# Patient Record
(demographics unavailable — no encounter records)

---

## 2021-01-22 NOTE — RAD
EXAM DESCRIPTION:  Abdomen Series



CLINICAL HISTORY: nv



COMPARISON: 8/12/2018



FINDINGS: Single frontal view of the chest. Upright and supine

views of the abdomen. 



Cardiomediastinal silhouette: Normal size and contour. 

Lungs: No consolidation, pneumothorax, or pleural effusion. 



Bones: Degenerative change of the spine and hips. 





Bowel: No dilated loops of large or small bowel. Moderate amount

stool.

Peritoneum: No free intraperitoneal air identified.

Solid organs: No definite organomegaly.

Other: IVC filter. Right iliac venous stent.



IMPRESSION:





1. No acute pulmonary process identified.



2. Nonobstructive bowel gas pattern. Moderate amount of stool.



Electronically signed by:  Rolando Deal  1/22/2021 6:45 AM

CST Workstation: [id number]

## 2021-01-22 NOTE — ED.PDOC
History of Present Illness





- General


Source: patient


Exam Limitations: no limitations





- History of Present Illness


Initial Comments: 





The patient is a 55-year-old  male presented emergency room secondary 

to 2 days of nausea vomiting abdominal cramping.  No chest pain or shortness of 

breath.  No syncope.  No sore throat or runny nose.  No rash.  Questionable 

exposure to coronavirus.  The patient is an insulin-dependent diabetic.  He 

reports his last blood sugar check was greater than 700.  He denies any history 

of pancreatitis.  He reports he still has his gallbladder and his appendix.  

Abdominal pain is diffuse on exam however may be a little worse in the right 

lower quadrant.


Timing/Duration: other - 2 days


Severity: moderate


Improving Factors: nothing


Worsening Factors: eating


Associated Symptoms: loss of appetite, malaise, nausea/vomiting, weakness





<Tash Cole - Last Filed: 21 07:03>





<Jaun Stone - Last Filed: 21 07:59>





- General


Chief Complaint: GI Problem


Stated Complaint: N/V/D, Headache,


Time Seen by Provider: 21 05:41





- History of Present Illness


Allergies/Adverse Reactions: 


Allergies





Codeine Allergy (Severe, Verified 20 10:51)


   Anaphylaxis





Home Medications: 


Ambulatory Orders





Insulin Degludec [Tresiba Flextouch] 40 unit SC DAILY 19 


Sertraline HCl [Zoloft] 100 mg PO DAILY 19 


Apixaban [Eliquis] 5 mg PO BID 19 


Cyclobenzaprine HCl [Cyclobenzaprine Hydrochlo] 10 mg PO DAILY 19 


Insulin Lispro [Humalog Kwikpen] See Protocol INJ PRN 19 


Meloxicam 15 mg PO DAILY 19 


Lisinopril 5 mg PO DAILY 19 


Apixaban [Eliquis] 5 mg PO BID #39 tab 19 


Bifidobacterium Infantis [Align] 4 mg PO DAILY  cap 19 


Cefdinir 300 mg PO BID #24 capsule 19 


Doxycycline Hyclate 100 mg PO BID #24 cap 19 


Cole/Poly/Hc Otic Susp [Cortisporin Otic Susp] 4 drop LEFT_EAR Q6H #10 days 

20 


Cephalexin Monohydrate [Keflex] 500 mg PO QID #40 cap 20 


Sulfamethoxazole-Trimethoprim [Bactrim Ds 800-160 mg] 1 tab PO BID #20 tab 

20 


Cephalexin Monohydrate [Keflex] 500 mg PO QID 7 Days #28 cap 21 


Ondansetron Tab [Zofran Tab] 4 mg PO TID PRN #12 tab 21 











Review of Systems





- Review of Systems


Constitutional: States: malaise


EENTM: States: no symptoms reported


Respiratory: States: no symptoms reported


Cardiology: States: no symptoms reported


Gastrointestinal/Abdominal: States: abdominal pain, diarrhea - Mild, nausea, 

vomiting


Genitourinary: States: no symptoms reported


Musculoskeletal: States: no symptoms reported - Chronic problems only


Skin: States: no symptoms reported


Neurological: States: no symptoms reported


Endocrine: States: no symptoms reported


All other Systems: No Change from Baseline





<Tash Cole - Last Filed: 21 07:03>





Past Medical History (General)





- Patient Medical History


Hx Seizures: Yes


Hx Stroke: Yes


Hx Dementia: No


Hx Asthma: No


Hx of COPD: No


Hx Cardiac Disorders: Yes - MI


Hx Congestive Heart Failure: No


Hx Pacemaker: No


Hx Hypertension: No


Hx Thyroid Disease: No


Hx Diabetes: Yes


Hx Gastroesophageal Reflux: No


Hx Renal Disease: No


Hx Cancer: No


Hx of HIV: No


Hx Hepatitis C: No


Hx MRSA: No





- Vaccination History


Hx Tetanus, Diphtheria Vaccination: Yes


Hx Influenza Vaccination: No


Hx Pneumococcal Vaccination: No





- Social History


Hx Tobacco Use: No


Hx Chewing Tobacco Use: No


Hx Alcohol Use: Yes


Hx Substance Use: No


Hx Substance Use Treatment: No


Hx Depression: No


Hx Physical Abuse: No


Hx Emotional Abuse: No


Hx Suspected Abuse: No





<Tash Cole - Last Filed: 21 07:03>





Family Medical History





- Family History


  ** Mother


Family History: Unknown


Living Status: Still Living


Hx Family Asthma: No


Hx Family Congestive Heart Failure: No


Hx Family Hypertension: No


Hx Family Stroke: No


Hx Cardiac Disease: No


Hx Family Diabetes: No


Hx Family Cancer: Yes





  ** Father


Living Status: 


Hx Family Asthma: No


Hx Family Congestive Heart Failure: No


Hx Family Hypertension: Yes


Hx Family Stroke: Yes


Hx Cardiac Disease: Yes


Hx Family Diabetes: Yes


Hx Family Cancer: Yes





<Tash Cole - Last Filed: 21 07:03>





Physical Exam





- Physical Exam


General Appearance: Alert, Anxious


Eye Exam: bilateral normal


Ears, Nose, Throat: hearing grossly normal, normal pharynx


Neck: non-tender - Chronic changes from previous traumas, supple


Respiratory: lungs clear, normal breath sounds, no respiratory distress, no 

accessory muscle use


Cardiovascular/Chest: normal peripheral pulses, regular rate, rhythm, no edema


Peripheral Pulses: radial,right: 2+, radial,left: 2+


Gastrointestinal/Abdominal: soft, other - Obese with mild increased discomfort t

o palpation in the right lower quadrant at this point


Rectal Exam: deferred


Extremity: no pedal edema, no calf tenderness, normal capillary refill, other - 

Chronic changes related to previous traumas


Neurologic: CNs II-XII nml as tested, alert, normal mood/affect, oriented x 3


Skin Exam: normal color


Comments: 





                                Laboratory Tests











  21





  06:07 06:07 06:07


 


WBC   8.8 


 


RBC   5.66 


 


Hgb   16.5 


 


Hct   48.5 


 


MCV   85.8 


 


MCH   29.2 


 


MCHC   34.0 


 


RDW   13.8 


 


Plt Count   208 


 


MPV   7.9 


 


Absolute Neuts (auto)   4.60 


 


Absolute Lymphs (auto)   3.10 


 


Absolute Monos (auto)   0.60 


 


Absolute Eos (auto)   0.30 


 


Absolute Basos (auto)   0.10 


 


Neutrophils %   52.5 


 


Lymphocytes %   35.4 


 


Monocytes %   7.1 


 


Eosinophils %   3.7 


 


Basophils %   1.3 


 


PT    10.2


 


INR    1.03


 


PTT (SP)    25.2


 


Sodium  136  


 


Potassium  3.6  


 


Chloride  101  


 


Carbon Dioxide  24  


 


Anion Gap  14.6  


 


BUN  25 H  


 


Creatinine  0.92  


 


BUN/Creatinine Ratio  27.2 H  


 


Random Glucose  261 H  


 


Serum Osmolality  285.4  


 


Lactic Acid   


 


Calcium  9.2  


 


Magnesium  1.7 L  


 


Total Bilirubin  1.0  


 


AST  20  


 


ALT  29  


 


Alkaline Phosphatase  110  


 


Creatine Kinase  129  


 


CK-MB (CK-2)  2.2  


 


CK-MB (CK-2) %  Not Reportable  


 


Troponin I  < 0.02  


 


B-Natriuretic Peptide  < 15.0  


 


Serum Total Protein  7.9  


 


Albumin  3.8  


 


Globulin  4.1 H  


 


Albumin/Globulin Ratio  0.9 L  


 


Amylase  64  


 


Lipase  63 H  


 


TSH  4.30  


 


Urine Color   


 


Urine Appearance   


 


Urine pH   


 


Ur Specific Gravity   


 


Urine Protein   


 


Urine Glucose (UA)   


 


Urine Ketones   


 


Urine Blood   


 


Urine Nitrite   


 


Urine Bilirubin   


 


Urine Urobilinogen   


 


Ur Leukocyte Esterase   


 


Urine RBC   


 


Urine WBC   


 


Ur Epithelial Cells   


 


Urine Bacteria   


 


Urine Mucus   














  21





  06:07 06:35


 


WBC  


 


RBC  


 


Hgb  


 


Hct  


 


MCV  


 


MCH  


 


MCHC  


 


RDW  


 


Plt Count  


 


MPV  


 


Absolute Neuts (auto)  


 


Absolute Lymphs (auto)  


 


Absolute Monos (auto)  


 


Absolute Eos (auto)  


 


Absolute Basos (auto)  


 


Neutrophils %  


 


Lymphocytes %  


 


Monocytes %  


 


Eosinophils %  


 


Basophils %  


 


PT  


 


INR  


 


PTT (SP)  


 


Sodium  


 


Potassium  


 


Chloride  


 


Carbon Dioxide  


 


Anion Gap  


 


BUN  


 


Creatinine  


 


BUN/Creatinine Ratio  


 


Random Glucose  


 


Serum Osmolality  


 


Lactic Acid  1.5 


 


Calcium  


 


Magnesium  


 


Total Bilirubin  


 


AST  


 


ALT  


 


Alkaline Phosphatase  


 


Creatine Kinase  


 


CK-MB (CK-2)  


 


CK-MB (CK-2) %  


 


Troponin I  


 


B-Natriuretic Peptide  


 


Serum Total Protein  


 


Albumin  


 


Globulin  


 


Albumin/Globulin Ratio  


 


Amylase  


 


Lipase  


 


TSH  


 


Urine Color   Yellow


 


Urine Appearance   Sl cloudy


 


Urine pH   5.5


 


Ur Specific Gravity   >= 1.030


 


Urine Protein   Negative


 


Urine Glucose (UA)   250 H


 


Urine Ketones   Negative


 


Urine Blood   Negative


 


Urine Nitrite   Negative


 


Urine Bilirubin   Negative


 


Urine Urobilinogen   0.2


 


Ur Leukocyte Esterase   Trace H


 


Urine RBC   0-1


 


Urine WBC   40-50 H


 


Ur Epithelial Cells   5-10


 


Urine Bacteria   Rare


 


Urine Mucus   Small














<Tash Cole - Last Filed: 21 07:03>





Progress





- Progress


Progress: 





21 07:04


The patient presents secondary to nausea and vomiting with uncontrolled 

diabetes.  Blood sugars are actually much better than what he had indicated.  

There may be a very small ion gap.  He is receiving a liter of IV fluids.  He is

receiving nausea medications.  He will need to be followed to make sure he can 

tolerate oral intake.  Several labs and imaging are still pending at this time. 

Patient to be followed by the oncoming ER physician.





tash cole [id number]





- Results/Orders


Results/Orders: 





covid negative





<Tash Cole - Last Filed: 21 07:03>





- Progress


Progress: 





0700: I, Dr. Jaun Stone, DO #[id number], have taken over care of pt. 





07:48 Recheck pt with spouse at bedside. NAD, VSS, and is feeling improved with 

mild continued headache and controlled nausea. PO tolerant. Pt does state that 

he has had some dysuria recently and in the setting of his UA with symptoms, I 

will prescribe antibiotics as well as antiemetic for home. He does state that 

both him and his wife became sick with epigastric discomfort and n/v s/p eating 

fajitas last night with sour cream. They both feel this is the source of his 

current symptoms. I have discussed radiology results, my clinical impression, 

and diagnosis. I have also discussed plan for discharge home with f/u, 

education, and prescribed medications. ED return precautions provided. Pt and 

family member voice understanding, agree with plan, and all questions answered.








- Results/Orders


Results/Orders: 





                                        





21 06:07


Telemetry .CONTINUOUS 


Vital Signs-Tilt PRN 





21 06:08


ABG [Arterial Blood Gas] Stat 





21 06:35


URINE CULTURE W/COLONY COUNT Stat 





21 07:11


Miscellaneous Nursing Order .ONCE 








                         Laboratory Results - last 24 hr











  21





  06:07 06:07 06:07


 


WBC   8.8 


 


RBC   5.66 


 


Hgb   16.5 


 


Hct   48.5 


 


MCV   85.8 


 


MCH   29.2 


 


MCHC   34.0 


 


RDW   13.8 


 


Plt Count   208 


 


MPV   7.9 


 


Absolute Neuts (auto)   4.60 


 


Absolute Lymphs (auto)   3.10 


 


Absolute Monos (auto)   0.60 


 


Absolute Eos (auto)   0.30 


 


Absolute Basos (auto)   0.10 


 


Neutrophils %   52.5 


 


Lymphocytes %   35.4 


 


Monocytes %   7.1 


 


Eosinophils %   3.7 


 


Basophils %   1.3 


 


PT    10.2


 


INR    1.03


 


PTT (SP)    25.2


 


Sodium  136  


 


Potassium  3.6  


 


Chloride  101  


 


Carbon Dioxide  24  


 


Anion Gap  14.6  


 


BUN  25 H  


 


Creatinine  0.92  


 


BUN/Creatinine Ratio  27.2 H  


 


Random Glucose  261 H  


 


Serum Osmolality  285.4  


 


Lactic Acid   


 


Calcium  9.2  


 


Magnesium  1.7 L  


 


Total Bilirubin  1.0  


 


AST  20  


 


ALT  29  


 


Alkaline Phosphatase  110  


 


Creatine Kinase  129  


 


CK-MB (CK-2)  2.2  


 


CK-MB (CK-2) %  Not Reportable  


 


Troponin I  < 0.02  


 


B-Natriuretic Peptide  < 15.0  


 


Serum Total Protein  7.9  


 


Albumin  3.8  


 


Globulin  4.1 H  


 


Albumin/Globulin Ratio  0.9 L  


 


Amylase  64  


 


Lipase  63 H  


 


TSH  4.30  


 


Urine Color   


 


Urine Appearance   


 


Urine pH   


 


Ur Specific Gravity   


 


Urine Protein   


 


Urine Glucose (UA)   


 


Urine Ketones   


 


Urine Blood   


 


Urine Nitrite   


 


Urine Bilirubin   


 


Urine Urobilinogen   


 


Ur Leukocyte Esterase   


 


Urine RBC   


 


Urine WBC   


 


Ur Epithelial Cells   


 


Urine Bacteria   


 


Urine Mucus   














  21





  06:07 06:35


 


WBC  


 


RBC  


 


Hgb  


 


Hct  


 


MCV  


 


MCH  


 


MCHC  


 


RDW  


 


Plt Count  


 


MPV  


 


Absolute Neuts (auto)  


 


Absolute Lymphs (auto)  


 


Absolute Monos (auto)  


 


Absolute Eos (auto)  


 


Absolute Basos (auto)  


 


Neutrophils %  


 


Lymphocytes %  


 


Monocytes %  


 


Eosinophils %  


 


Basophils %  


 


PT  


 


INR  


 


PTT (SP)  


 


Sodium  


 


Potassium  


 


Chloride  


 


Carbon Dioxide  


 


Anion Gap  


 


BUN  


 


Creatinine  


 


BUN/Creatinine Ratio  


 


Random Glucose  


 


Serum Osmolality  


 


Lactic Acid  1.5 


 


Calcium  


 


Magnesium  


 


Total Bilirubin  


 


AST  


 


ALT  


 


Alkaline Phosphatase  


 


Creatine Kinase  


 


CK-MB (CK-2)  


 


CK-MB (CK-2) %  


 


Troponin I  


 


B-Natriuretic Peptide  


 


Serum Total Protein  


 


Albumin  


 


Globulin  


 


Albumin/Globulin Ratio  


 


Amylase  


 


Lipase  


 


TSH  


 


Urine Color   Yellow


 


Urine Appearance   Sl cloudy


 


Urine pH   5.5


 


Ur Specific Gravity   >= 1.030


 


Urine Protein   Negative


 


Urine Glucose (UA)   250 H


 


Urine Ketones   Negative


 


Urine Blood   Negative


 


Urine Nitrite   Negative


 


Urine Bilirubin   Negative


 


Urine Urobilinogen   0.2


 


Ur Leukocyte Esterase   Trace H


 


Urine RBC   0-1


 


Urine WBC   40-50 H


 


Ur Epithelial Cells   5-10


 


Urine Bacteria   Rare


 


Urine Mucus   Small











EXAM DESCRIPTION: Abdomen Series  CLINICAL HISTORY: nv  COMPARISON: 2018  

FINDINGS: Single frontal view of the chest. Upright and supine views of the 

abdomen.  Cardiomediastinal silhouette: Normal size and contour. Lungs: No 

consolidation, pneumothorax, or pleural effusion.  Bones: Degenerative change of

the spine and hips.   Bowel: No dilated loops of large or small bowel. Moderate 

amount stool. Peritoneum: No free intraperitoneal air identified. Solid organs: 

No definite organomegaly. Other: IVC filter. Right iliac venous stent.  

IMPRESSION:   1. No acute pulmonary process identified.  2. Nonobstructive bowel

gas pattern. Moderate amount of stool.  Electronically signed by: Rolando Deal 2021 6:45 AM CST Workstation: [id number]





                               Vital Signs - 24 hr











  21





  05:35


 


Temperature 97 F L


 


Pulse Rate [ 98 H





pulse ox] 


 


Respiratory 18





Rate 


 


Blood Pressure 131/86





[Left Arm] 


 


O2 Sat by Pulse 99





Oximetry 














<Jaun Stone - Last Filed: 21 07:59>





Departure





<Tash Cole - Last Filed: 21 07:03>





- Departure


Time of Disposition: 07:46


Diet: bland diet





<Jaun Stone - Last Filed: 21 07:59>





- Departure


Clinical Impression: 


Nausea and vomiting


Qualifiers:


 Vomiting type: unspecified Vomiting Intractability: non-intractable Qualified 

Code(s): R11.2 - Nausea with vomiting, unspecified





Diabetes mellitus with hyperglycemia


Qualifiers:


 Diabetes mellitus type: type 2 Diabetes mellitus long term insulin use: 

unspecified long term insulin use status Qualified Code(s): E11.65 - Type 2 

diabetes mellitus with hyperglycemia





UTI (urinary tract infection)


Qualifiers:


 Urinary tract infection type: acute cystitis Hematuria presence: without 

hematuria Qualified Code(s): N30.00 - Acute cystitis without hematuria





Disposition: Discharge to Home or Self Care


Condition: Excellent


Departure Forms:  ED Discharge - Pt. Copy, Patient Portal Self Enrollment


Instructions:  Nausea and Vomiting, Adult (DC), Diabetes Type 2 (DC), Urinary 

Tract Infection, Adult (DC)


Referrals: 


Your, Primary Care Physician [Other] - 1-2 Weeks (Change this mornings 10:00 AM 

appointment to 2021 for f/u and keep todays pain management 

appointment at 12:00 PM; as discussed at bedside.)


Prescriptions: 


Cephalexin Monohydrate [Keflex] 500 mg PO QID 7 Days #28 cap


Ondansetron Tab [Zofran Tab] 4 mg PO TID PRN #12 tab


 PRN Reason: Nausea/Vomiting


Home Medications: 


Ambulatory Orders





Insulin Degludec [Tresiba Flextouch] 40 unit SC DAILY 19 


Sertraline HCl [Zoloft] 100 mg PO DAILY 19 


Apixaban [Eliquis] 5 mg PO BID 19 


Cyclobenzaprine HCl [Cyclobenzaprine Hydrochlo] 10 mg PO DAILY 19 


Insulin Lispro [Humalog Kwikpen] See Protocol INJ PRN 19 


Meloxicam 15 mg PO DAILY 19 


Lisinopril 5 mg PO DAILY 19 


Apixaban [Eliquis] 5 mg PO BID #39 tab 19 


Bifidobacterium Infantis [Align] 4 mg PO DAILY  cap 19 


Cefdinir 300 mg PO BID #24 capsule 19 


Doxycycline Hyclate 100 mg PO BID #24 cap 19 


Cole/Poly/Hc Otic Susp [Cortisporin Otic Susp] 4 drop LEFT_EAR Q6H #10 days 

20 


Cephalexin Monohydrate [Keflex] 500 mg PO QID #40 cap 20 


Sulfamethoxazole-Trimethoprim [Bactrim Ds 800-160 mg] 1 tab PO BID #20 tab  


Cephalexin Monohydrate [Keflex] 500 mg PO QID 7 Days #28 cap 21 


Ondansetron Tab [Zofran Tab] 4 mg PO TID PRN #12 tab 21